# Patient Record
Sex: FEMALE | Race: WHITE | NOT HISPANIC OR LATINO | Employment: FULL TIME | URBAN - METROPOLITAN AREA
[De-identification: names, ages, dates, MRNs, and addresses within clinical notes are randomized per-mention and may not be internally consistent; named-entity substitution may affect disease eponyms.]

---

## 2023-08-02 ENCOUNTER — OFFICE VISIT (OUTPATIENT)
Dept: PRIMARY CARE | Facility: CLINIC | Age: 38
End: 2023-08-02
Payer: COMMERCIAL

## 2023-08-02 ENCOUNTER — LAB (OUTPATIENT)
Dept: LAB | Facility: LAB | Age: 38
End: 2023-08-02
Payer: COMMERCIAL

## 2023-08-02 VITALS
RESPIRATION RATE: 18 BRPM | HEART RATE: 88 BPM | SYSTOLIC BLOOD PRESSURE: 108 MMHG | DIASTOLIC BLOOD PRESSURE: 60 MMHG | OXYGEN SATURATION: 97 % | BODY MASS INDEX: 19.93 KG/M2 | WEIGHT: 124 LBS | TEMPERATURE: 97.8 F | HEIGHT: 66 IN

## 2023-08-02 DIAGNOSIS — A69.20 ERYTHEMA MIGRANS (LYME DISEASE): ICD-10-CM

## 2023-08-02 DIAGNOSIS — Z23 NEED FOR TDAP VACCINATION: ICD-10-CM

## 2023-08-02 DIAGNOSIS — S90.561D TICK BITE OF RIGHT ANKLE, SUBSEQUENT ENCOUNTER: ICD-10-CM

## 2023-08-02 DIAGNOSIS — Z00.00 ANNUAL PHYSICAL EXAM: Primary | ICD-10-CM
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
## 2023-08-02 PROBLEM — S90.561A TICK BITE OF RIGHT ANKLE: Status: ACTIVE | Noted: 2023-08-02
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
## 2023-08-02 PROCEDURE — 99385 PREV VISIT NEW AGE 18-39: CPT | Performed by: PHYSICIAN ASSISTANT

## 2023-08-02 PROCEDURE — 90715 TDAP VACCINE 7 YRS/> IM: CPT | Performed by: PHYSICIAN ASSISTANT

## 2023-08-02 PROCEDURE — 36415 COLL VENOUS BLD VENIPUNCTURE: CPT

## 2023-08-02 PROCEDURE — 1036F TOBACCO NON-USER: CPT | Performed by: PHYSICIAN ASSISTANT

## 2023-08-02 PROCEDURE — 90471 IMMUNIZATION ADMIN: CPT | Performed by: PHYSICIAN ASSISTANT

## 2023-08-02 PROCEDURE — 87476 LYME DIS DNA AMP PROBE: CPT

## 2023-08-02 RX ORDER — MULTIVITAMIN
1 TABLET ORAL DAILY
COMMUNITY

## 2023-08-02 ASSESSMENT — ENCOUNTER SYMPTOMS
DIARRHEA: 0
ARTHRALGIAS: 0
NAUSEA: 0
ABDOMINAL PAIN: 0
VOMITING: 0
SHORTNESS OF BREATH: 0
EYE PAIN: 0
MYALGIAS: 0
CONSTIPATION: 0

## 2023-08-02 NOTE — ASSESSMENT & PLAN NOTE
PREVENTIVE CARE SCREENING:  - Labs/ Lipid screen: UTD   - PAP: DUE, advised she schedule appt   - Tdap: UTD   - COVID-19 vax: UTD x 3   - Mood is good  - Home life is good, lives in [place] with her  of 15 years and four children (12, 10, 8, 6yo)   - Work life is good -  at [employer] in [place] (her kids go there as well)   - Discussed DIET - healthy and balanced, encouraged she continue this!   - Discussed EXERCISE - She exercises 5 days a week - weight lifting. Encouraged she continue this!     - Encouraged use of sunscreen daily  - Encouraged pt to get yearly eye and dental exams  - Encouraged to wear seatbelt   - Encouraged pt to have working smoke detector/ carbon monoxide detector and to have a fire escape plan

## 2023-08-02 NOTE — ASSESSMENT & PLAN NOTE
- Noticed and removed it 6/29/23 and the next day took one dose of doxycycline  - Watched it and developed a red ring so went to [facility] and was given doxycycline x 14 days   - No more sxs   - Will check lyme PCR

## 2023-08-02 NOTE — PROGRESS NOTES
Subjective   Patient ID: Kailyn Knutson is a 38 y.o. female who presents for Establish Care (New pt here today to [facility]; pt seen OBGYN due to having 4 children within 6 years. Pt states was bite by a tick on outer right ankle early summer and did a one day Doxycyline 2 tabs once with a telehealth. It was a little scab for a while and then turned pink around it and then a ring. So went to [facility] clinic 7/17 and was put on Doxycyline 100mg twice a day for 14 days. Still pink so wants to know if its healed. ).    HPI     Prevent/ Wellness Visit:   - Lives at home in [place] with her  of 15 years and four children (12, 10, 8, 6yo) - home life is good!   - Employment -  at [employer] in [place] (her kids go there)   - Labs: UTD   - PAP: DUE, advised she scheudl   - Td: UTD   - COVID-19 vax: UTD x 3   - Diet: healthy, well balanced, protrein brain morning, salad, whole wheat crackers, ffruit for lunch, dinner, meat, veggie, carb. Drinks coffee in the morning and then water   - Exercise: 5 days a week, weight lifting   - Sexual hx: with , no concerns   - Tobacco: no   - Illicit drugs: no   - Alcohol: social here and there   - Mood: good   - Hobbies:    - Dentist visits: utd   - Eye exams: utd (contacts)   - Sunscreen: yes   - Seatbelt: yes   - Fire detector: yes     Tick bite  - Timothy 29th found it int eh eeneing after coming back from [place]   - jusband pulled it off with his fingers and put it in the toilet   - had a televisit that night and the doctor gave her one dose of doxycyclien  - it was a scab for 10--12 days and looked like it was healing   - went on ta trip and noticed it getting red July 11th then 4 days later   - came back from the trip and went to [facility] and saw CNP who gave 14 days of doxycyline she just finished on Monday       Review of Systems   Eyes:  Negative for pain and visual disturbance.   Respiratory:  Negative for shortness of breath.    Cardiovascular:  Negative for  "chest pain.   Gastrointestinal:  Negative for abdominal pain, constipation, diarrhea, nausea and vomiting.   Musculoskeletal:  Negative for arthralgias and myalgias.   Skin:  Negative for rash.       Objective   /60   Pulse 88   Temp 36.6 °C (97.8 °F)   Resp 18   Ht 1.683 m (5' 6.25\")   Wt 56.2 kg (124 lb)   SpO2 97%   BMI 19.86 kg/m²     Physical Exam  Constitutional:       General: She is not in acute distress.  HENT:      Head: Normocephalic.      Right Ear: Tympanic membrane and ear canal normal.      Left Ear: Tympanic membrane and ear canal normal.      Nose: Nose normal.      Mouth/Throat:      Mouth: Mucous membranes are moist.      Pharynx: Oropharynx is clear.   Eyes:      Extraocular Movements: Extraocular movements intact.      Conjunctiva/sclera: Conjunctivae normal.      Pupils: Pupils are equal, round, and reactive to light.   Cardiovascular:      Rate and Rhythm: Normal rate and regular rhythm.      Pulses: Normal pulses.      Heart sounds: No murmur heard.  Pulmonary:      Effort: Pulmonary effort is normal.      Breath sounds: Normal breath sounds. No wheezing, rhonchi or rales.   Abdominal:      General: Bowel sounds are normal. There is no distension.      Palpations: Abdomen is soft. There is no mass.      Tenderness: There is no abdominal tenderness. There is no guarding.   Musculoskeletal:         General: Normal range of motion.      Cervical back: Neck supple.   Lymphadenopathy:      Cervical: No cervical adenopathy.   Skin:     General: Skin is warm and dry.      Findings: No lesion or rash.   Neurological:      General: No focal deficit present.      Mental Status: She is alert.      Gait: Gait normal.   Psychiatric:         Mood and Affect: Mood normal.         Assessment/Plan   Problem List Items Addressed This Visit       Annual physical exam - Primary     PREVENTIVE CARE SCREENING:  - Labs/ Lipid screen: UTD   - PAP: DUE, advised she schedule appt   - Tdap: UTD   - " COVID-19 vax: UTD x 3   - Mood is good  - Home life is good, lives in [place] with her  of 15 years and four children (12, 10, 8, 4yo)   - Work life is good -  at [employer] in [place] (her kids go there as well)   - Discussed DIET - healthy and balanced, encouraged she continue this!   - Discussed EXERCISE - She exercises 5 days a week - weight lifting. Encouraged she continue this!     - Encouraged use of sunscreen daily  - Encouraged pt to get yearly eye and dental exams  - Encouraged to wear seatbelt   - Encouraged pt to have working smoke detector/ carbon monoxide detector and to have a fire escape plan         Tick bite of right ankle     - Noticed and removed it 6/29/23 and the next day took one dose of doxycycline  - Watched it and developed a red ring so [facility] and was given [facility]ine x 14 days   - No more sxs   - Will check lyme PCR           Other Visit Diagnoses       Need for Tdap vaccination        Relevant Orders    Tdap vaccine, age 10 years and older (BOOSTRIX) (Completed)    Erythema migrans (Lyme disease)        Relevant Orders    Lyme Disease (Borrelia burgdorferi), PCR

## 2023-08-08 LAB — LYME DISEASE (BORRELIA BURGDORFERI), PCR: NOT DETECTED

## 2024-01-12 ENCOUNTER — OFFICE VISIT (OUTPATIENT)
Dept: PRIMARY CARE | Facility: CLINIC | Age: 39
End: 2024-01-12
Payer: COMMERCIAL

## 2024-01-12 VITALS
WEIGHT: 125 LBS | TEMPERATURE: 98.2 F | SYSTOLIC BLOOD PRESSURE: 112 MMHG | HEIGHT: 66 IN | OXYGEN SATURATION: 98 % | HEART RATE: 69 BPM | RESPIRATION RATE: 20 BRPM | BODY MASS INDEX: 20.09 KG/M2 | DIASTOLIC BLOOD PRESSURE: 82 MMHG

## 2024-01-12 DIAGNOSIS — N63.25 BREAST LUMP ON LEFT SIDE AT 6 O'CLOCK POSITION: Primary | ICD-10-CM

## 2024-01-12 PROCEDURE — 1036F TOBACCO NON-USER: CPT | Performed by: PHYSICIAN ASSISTANT

## 2024-01-12 PROCEDURE — 99213 OFFICE O/P EST LOW 20 MIN: CPT | Performed by: PHYSICIAN ASSISTANT

## 2024-01-12 NOTE — PROGRESS NOTES
"Subjective   Patient ID: Kailyn Knutson is a 38 y.o. female who presents for Breast Problem (Pt here today stating that she found a lump in left breast which is tender/painful last Sat woke up with lump under left breast and lasted till Tuesday with pain and swelling but now smaller in size and tender.).    HPI     Left breast lump and tender   - Woke up 1 week ago and left breast was tender hard and swollen   - Everything improved but still feels like there's something there     Review of Systems   Skin:         Left breast lump/ pain       Objective   /82   Pulse 69   Temp 36.8 °C (98.2 °F)   Resp 20   Ht 1.683 m (5' 6.25\")   Wt 56.7 kg (125 lb)   SpO2 98%   BMI 20.02 kg/m²     Physical Exam  Chest:      Chest wall: Mass and tenderness present.             Assessment/Plan     Problem List Items Addressed This Visit    None  Visit Diagnoses       Breast lump on left side at 6 o'clock position    -  Primary    Relevant Orders    BI mammo left diagnostic    BI US breast limited left        - May have been cyst/abscess - sxs improving now   - Will check mamm/US for precaution       "

## 2024-01-30 ENCOUNTER — HOSPITAL ENCOUNTER (OUTPATIENT)
Dept: RADIOLOGY | Facility: HOSPITAL | Age: 39
Discharge: HOME | End: 2024-01-30
Payer: COMMERCIAL

## 2024-01-30 DIAGNOSIS — N63.25 BREAST LUMP ON LEFT SIDE AT 6 O'CLOCK POSITION: ICD-10-CM

## 2024-01-30 PROCEDURE — 76642 ULTRASOUND BREAST LIMITED: CPT | Mod: LT

## 2024-01-30 PROCEDURE — 77065 DX MAMMO INCL CAD UNI: CPT | Mod: LEFT SIDE | Performed by: RADIOLOGY

## 2024-01-30 PROCEDURE — 76642 ULTRASOUND BREAST LIMITED: CPT | Mod: LEFT SIDE | Performed by: RADIOLOGY

## 2024-01-30 PROCEDURE — 77061 BREAST TOMOSYNTHESIS UNI: CPT | Mod: LEFT SIDE | Performed by: RADIOLOGY

## 2024-01-30 PROCEDURE — 77061 BREAST TOMOSYNTHESIS UNI: CPT | Mod: LT
